# Patient Record
(demographics unavailable — no encounter records)

---

## 2025-01-16 NOTE — DATA REVIEWED
[FreeTextEntry1] : Transthoracic Echocardiogram 11/2224 - EF56% - Mild to Moderate MR - Moderate to Severe Tricuspid Regurgitation - PAP 53mmHg - Mild AI - Mild IN - Moderate to Severe Aortic Stenosis

## 2025-01-16 NOTE — ASSESSMENT
[FreeTextEntry1] : I have independently reviewed the medical records and imaging at the time of this office consultation and discussed the following interpretations with Mr. GAN:   After review of the TTE, Mr. GAN is noted to have severe aortic stenosis - peak gradient 53.6 mmHg, mean gradient 37 mmHg, and an aortic valve area  0.97 cm^2. The risks and benefits of both Surgical Aortic Valve Replacement versus Transcatheter Aortic Valve Replacement (TAVR) have been explained and he would like to proceed with further workup and consideration for TAVR by the Structural Heart Team.   Risks, benefits and alternatives to TAVR were discussed with the patient in detail. Risks discussed included, but not limited to, infection, bleeding, myocardial infarction, cerebrovascular accident, renal failure, vascular injury requiring intervention, cardiac rupture and death. In addition, a roughly 5-10% risk of significant heart block requiring permanent pacemaker implantation was highlighted.   During this visit a shared decision-making process was utilized and included Mr. GAN, his family, and the available options.  Surgical options, transcatheter options and alternatives to surgery were discussed. Mr. GAN's values and preferences were considered. He fully understands and wishes to proceed. All questions were answered to his understanding and satisfaction.   Prior to TAVR, Mr. GAN will require a dedicated TAVR CT scan. The purpose of this CT scan is to evaluate the size of the aortic valve and annulus, and measure peripheral vessel diameters to determine feasibility for transcatheter access for the TAVR, and measure of alternate access options if transfemoral is not feasible.  Once the TAVR CT scan is complete the scan will be reviewed by a multidisciplinary team of interventional cardiologist, cardiac surgeons and PAs and NPs, to plan the best approach for the surgical procedure.   PREOPERATIVE CHECKLIST: (Discussed with patient)  - Confirm allergies, including latex: - Confirm pacemaker: - Anticoagulation/antiplatelets noted and will be discontinued/continued: - SGLT-2 Inhibitors (discontinued 3 days prior to surgery) or GLP-1 (discontinued 1 week prior to surgery): - All other supplements, NSAIDs and fish oil were discussed and will be held one week before surgery   PLAN: - TAVR CTA - Cardiac Catheterization, to assess the Coronary arteries - Transthoracic Echocardiogram at [facility] to assess the structure and function of the heart valves  - TAVR tentatively

## 2025-01-16 NOTE — HISTORY OF PRESENT ILLNESS
[FreeTextEntry1] : Mr. RACHELLE GAN is a 84 year old male referred by Dr. Collins for Aortic Stenosis.   He has a PMH pertinent for HLD, CAD, and Severe Aortic Stenosis.    He presents today for TAVR candidacy.    NYHA Class

## 2025-01-17 NOTE — DATA REVIEWED
[FreeTextEntry1] : Transthoracic Echocardiogram 11/2224 - EF56% - Mild to Moderate MR - Moderate to Severe Tricuspid Regurgitation - PAP 53mmHg - Mild AI - Mild AK - Moderate to Severe Aortic Stenosis

## 2025-01-17 NOTE — ASSESSMENT
[FreeTextEntry1] : I have independently reviewed the medical records and imaging at the time of this office consultation and discussed the following interpretations with Mr. GAN:   After review of the TTE, Mr. GAN is noted to have severe aortic stenosis - peak gradient 53.6 mmHg, mean gradient 37 mmHg, and an aortic valve area  0.97 cm^2. The risks and benefits of both Surgical Aortic Valve Replacement versus Transcatheter Aortic Valve Replacement (TAVR) have been explained and he would like to proceed with further workup and consideration for TAVR by the Structural Heart Team.   Risks, benefits and alternatives to TAVR were discussed with the patient in detail. Risks discussed included, but not limited to, infection, bleeding, myocardial infarction, cerebrovascular accident, renal failure, vascular injury requiring intervention, cardiac rupture and death. In addition, a roughly 5-10% risk of significant heart block requiring permanent pacemaker implantation was highlighted.   During this visit a shared decision-making process was utilized and included Mr. GAN, his family, and the available options.  Surgical options, transcatheter options and alternatives to surgery were discussed. Mr. GAN's values and preferences were considered. He fully understands and wishes to proceed. All questions were answered to his understanding and satisfaction.   Prior to TAVR, Mr. GAN will require a dedicated TAVR CT scan. The purpose of this CT scan is to evaluate the size of the aortic valve and annulus, and measure peripheral vessel diameters to determine feasibility for transcatheter access for the TAVR, and measure of alternate access options if transfemoral is not feasible.  Once the TAVR CT scan is complete the scan will be reviewed by a multidisciplinary team of interventional cardiologist, cardiac surgeons and PAs and NPs, to plan the best approach for the surgical procedure.   PREOPERATIVE CHECKLIST: (Discussed with patient)  - Confirm allergies, including latex: - Confirm pacemaker: - Anticoagulation/antiplatelets noted and will be discontinued/continued: - SGLT-2 Inhibitors (discontinued 3 days prior to surgery) or GLP-1 (discontinued 1 week prior to surgery): - All other supplements, NSAIDs and fish oil were discussed and will be held one week before surgery   PLAN: - TAVR CTA - Cardiac Catheterization, to assess the Coronary arteries - Transthoracic Echocardiogram at [facility] to assess the structure and function of the heart valves  - TAVR tentatively

## 2025-01-21 NOTE — HISTORY OF PRESENT ILLNESS
[FreeTextEntry1] : Mr. RACHELLE GAN is a 84 year old male referred by Dr. Collins for Aortic Stenosis.   He has a PMH pertinent for HLD, CAD (CABG 1981 at [facility], PCI 2004), BPH, pulmonary artery stenosis, and Severe Aortic Stenosis.    He presents today for TAVR candidacy. He denies any dizziness, or syncopal episode. For the past year he has noticed a progressive shortness of breath with minimal exertion as well as lower extremity edema. He has noticed a pressure in the chest and a heaviness inside of his head with mild exertion.     NYHA Class III  Pulmonologist: Dr. Santi Snowden

## 2025-01-21 NOTE — ASSESSMENT
[FreeTextEntry1] : I have independently reviewed the medical records and imaging at the time of this office consultation and discussed the following interpretations with Mr. GAN:   After review of the TTE, Mr. GAN is noted to have severe aortic stenosis - peak gradient 53.6 mmHg, mean gradient 37 mmHg, and an aortic valve area  0.97 cm^2. The risks and benefits of both Surgical Aortic Valve Replacement versus Transcatheter Aortic Valve Replacement (TAVR) have been explained and he would like to proceed with further workup and consideration for TAVR by the Structural Heart Team.   Risks, benefits and alternatives to TAVR were discussed with the patient in detail. Risks discussed included, but not limited to, infection, bleeding, myocardial infarction, cerebrovascular accident, renal failure, vascular injury requiring intervention, cardiac rupture and death. In addition, a roughly 5-10% risk of significant heart block requiring permanent pacemaker implantation was highlighted.   During this visit a shared decision-making process was utilized and included Mr. GAN, his family, and the available options.  Surgical options, transcatheter options and alternatives to surgery were discussed. Mr. GAN's values and preferences were considered. He fully understands and wishes to proceed. All questions were answered to his understanding and satisfaction.   Prior to TAVR, Mr. GAN will require a dedicated TAVR CT scan. The purpose of this CT scan is to evaluate the size of the aortic valve and annulus, and measure peripheral vessel diameters to determine feasibility for transcatheter access for the TAVR, and measure of alternate access options if transfemoral is not feasible.  Once the TAVR CT scan is complete the scan will be reviewed by a multidisciplinary team of interventional cardiologist, cardiac surgeons and PAs and NPs, to plan the best approach for the surgical procedure.   PREOPERATIVE CHECKLIST: (Discussed with patient)  - Confirm allergies, including latex: - Confirm pacemaker: - Anticoagulation/antiplatelets noted and will be discontinued/continued: - SGLT-2 Inhibitors (discontinued 3 days prior to surgery) or GLP-1 (discontinued 1 week prior to surgery): - All other supplements, NSAIDs and fish oil were discussed and will be held one week before surgery   PLAN: - TAVR CTA - Cardiac Catheterization, to assess the Coronary arteries - Transthoracic Echocardiogram at [facility] to assess the structure and function of the heart valves  - TAVR tentatively        IDr. Joshi personally performed the evaluation and management (E/M) services for this new patient. That E/M includes conducting the initial examination, assessing all conditions, and establishing the plan of care. Today, Geovany Garrett NP was here to observe my evaluation and management services for this patient.

## 2025-01-21 NOTE — REVIEW OF SYSTEMS
[Palpitations] : palpitations [Lower Ext Edema] : lower extremity edema [Shortness Of Breath] : shortness of breath [SOB on Exertion] : shortness of breath during exertion [Negative] : Heme/Lymph [Chest Pain] : no chest pain

## 2025-01-21 NOTE — PHYSICAL EXAM
[General Appearance - Well Nourished] : well nourished [General Appearance - Well Developed] : well developed [Sclera] : the sclera and conjunctiva were normal [Outer Ear] : the ears and nose were normal in appearance [Neck Appearance] : the appearance of the neck was normal [Respiration, Rhythm And Depth] : normal respiratory rhythm and effort [Auscultation Breath Sounds / Voice Sounds] : lungs were clear to auscultation bilaterally [Heart Rate And Rhythm] : heart rate was normal and rhythm regular [Examination Of The Chest] : the chest was normal in appearance [2+] : left 2+ [Abnormal Walk] : normal gait [Skin Color & Pigmentation] : normal skin color and pigmentation [Sensation] : the sensory exam was normal to light touch and pinprick [Oriented To Time, Place, And Person] : oriented to person, place, and time [Impaired Insight] : insight and judgment were intact [FreeTextEntry1] : NYHAC III

## 2025-01-21 NOTE — DATA REVIEWED
[FreeTextEntry1] : Transthoracic Echocardiogram 11/22/24 - EF56% - Mild to Moderate MR - Moderate to Severe Tricuspid Regurgitation - PAP 53mmHg - Mild AI - Mild FL - Moderate to Severe Aortic Stenosis

## 2025-03-10 NOTE — HISTORY OF PRESENT ILLNESS
[FreeTextEntry1] : 84 Male with Severe Aortic stenosis, Mod AI/MR/TR, Chronic Diastolic CHF, Remote MI, CAD s/p C1L (9/30/81 - still patent) & Multiple PCI (last to mLCx & OM2 10/24/23 - also patent stents to LAD & RCA), HTN, HLD, Mild Pulm HTN, Baseline Sinus Bradycardia with 1st degree AVB, Former Smoker, Stage 2 CKD, BPH, Chronic Anemia, Obesity, OA, Cervical Spondylosis, Right Rotator Cuff Repair, Spinal Stenosis s/p Revision Laminectomy of L2L4 & Fusion L2L3 with CBP, Left THR, Ankle Surgery, [place] Vet with PTSD with Anxiety/Depression, b/l Cataract Surgery  (A1C 5.5) on 3/5/25 admitted through [facility], 3/5 Moderate Sedation Percutaneous TF-TAVR via RCFA (Amy) LVEDP 16 mmHg, post deployment no PVL (mG 7 mmHg), no conduction or rhythm changes, transferred to  [place] [facility].  Patient with no significant issues post op and remained stable for discharge with MCOT monitoring. Initial [facility] visit completed in home.  CC "I'm feeling well"

## 2025-03-10 NOTE — PHYSICAL EXAM
[Sclera] : the sclera and conjunctiva were normal [Neck Appearance] : the appearance of the neck was normal [Respiration, Rhythm And Depth] : normal respiratory rhythm and effort [Exaggerated Use Of Accessory Muscles For Inspiration] : no accessory muscle use [Auscultation Breath Sounds / Voice Sounds] : lungs were clear to auscultation bilaterally [Apical Impulse] : the apical impulse was normal [Heart Rate And Rhythm] : heart rate was normal and rhythm regular [Heart Sounds] : normal S1 and S2 [Murmurs] : no murmurs [Chest Visual Inspection Thoracic Asymmetry] : no chest asymmetry [1+] : left 1+ [FreeTextEntry2] : B/L LE with trace pedal edema, B/L calves soft, [Bowel Sounds] : normal bowel sounds [Abnormal Walk] : normal gait [Musculoskeletal - Swelling] : no joint swelling seen [Skin Color & Pigmentation] : normal skin color and pigmentation [Skin Turgor] : normal skin turgor [] : no rash [FreeTextEntry1] : at baseline  [Oriented To Time, Place, And Person] : oriented to person, place, and time [Impaired Insight] : insight and judgment were intact [Affect] : the affect was normal

## 2025-03-10 NOTE — REASON FOR VISIT
[Follow-Up: _____] : a [unfilled] follow-up visit [Spouse] : spouse [FreeTextEntry1] : [facility]- Transitional Care- [facility]

## 2025-03-10 NOTE — ASSESSMENT
[FreeTextEntry1] : Pt recovering well at home s/p TAVR. Reviewed all medications and dosages with pt understanding. Pt has all medications in home and is taking as prescribed. Pain controlled with current medication regimen. No new symptoms, issues or concerns to report at this time. Pt MCOT with low sensor battery left, took off patch and demonstrated how to apply once fully charged. Pt and spouse with good understanding. Pt is ambulating well and has no concerns at this time.   PLAN:  -Continue current medication regimen   -Continue Post Operative Care including:      -Cleanse all incisions DAILY with mild soap and water. Avoid lotion, powders and/or creams near or on incisions       -Daily weights- report any increase of 2 lbs or more overnight to the [facility] or [facility] team       -Incentive spirometry with cough and deep breathing several times per hour      -Ambulate as much as tolerated including outdoors if weather and safety permits      -Avoid lifting anything more than 5 lbs and avoid straining      -Maintain a low sodium, low fat, heart healthy diet, including healthy sources of protein   [facility] team will continue to follow up with pt status. NP/CCC roles explained with pt understanding, contact information provided. Pt agrees to call with any questions, issues or concerns.  Worsening symptoms reviewed with patient understanding.    FOLLOW UP APPOINTMENTS:  CTS: 3/12 CARDIOLOGIST:  Dr. Collins, wife to schedule sooner appt as pt next appt in June. She will update [facility] team if unable to obtain timely appt PCP: Pt encouraged to follow up within one month of discharge

## 2025-03-12 NOTE — REASON FOR VISIT
[de-identified] : Transcatheter Aortic Valve Replacement with #26 Amy Mensah Valve [de-identified] : 3/5/25 [de-identified] : Post op course uneventful, patient discharged 3/6/25.  He states he has been feeling a little lightheaded everyday.  He naps during the day and is in bed early.  He has some "phlegm" he is unable to get up.  He feels he is coming down with a head cold. He feels each day he is completing more activity and went to [employer] yesterday but had to sit down. The swelling in his feet has went down substaintially.

## 2025-03-12 NOTE — PROCEDURE
[FreeTextEntry1] : Postop Transthoracic Echocardiogram 3/5/25 1. Left ventricular cavity is normal in size. Left ventricular wall thickness is normal. Left ventricular systolic function is normal with an ejection fraction visually estimated at 55 to 60 %.  2. There is mild (grade 1) left ventricular diastolic dysfunction, with elevated left ventricular filling pressure.  3. Normal right ventricular cavity size and normal right ventricular systolic function.  4. Left atrium is severely dilated.  5. The right atrium is mildly dilated.  6. There is mild calcification of the mitral valve annulus.  7. Thickened mitral valve leaflets.  8. Mild mitral regurgitation.  9. A DARRELL 3 Ultra RESILIA (TAVR) valve replacement is present in the aortic position The prosthetic valve is well seated. No intravalvular regurgitation No paravalvular regurgitation. 10. Moderate to severe tricuspid regurgitation. 11. Mild to moderate pulmonic regurgitation. 12. No pericardial effusion seen. 13. Estimated pulmonary artery systolic pressure is 32 mmHg, consistent with normal pulmonary artery pressure. 14. Compared to the transthoracic echocardiogram performed on 2/13/2025, patient is s/p TAVR.
[FreeTextEntry1] : Postop Transthoracic Echocardiogram 3/5/25 1. Left ventricular cavity is normal in size. Left ventricular wall thickness is normal. Left ventricular systolic function is normal with an ejection fraction visually estimated at 55 to 60 %.  2. There is mild (grade 1) left ventricular diastolic dysfunction, with elevated left ventricular filling pressure.  3. Normal right ventricular cavity size and normal right ventricular systolic function.  4. Left atrium is severely dilated.  5. The right atrium is mildly dilated.  6. There is mild calcification of the mitral valve annulus.  7. Thickened mitral valve leaflets.  8. Mild mitral regurgitation.  9. A DARRELL 3 Ultra RESILIA (TAVR) valve replacement is present in the aortic position The prosthetic valve is well seated. No intravalvular regurgitation No paravalvular regurgitation. 10. Moderate to severe tricuspid regurgitation. 11. Mild to moderate pulmonic regurgitation. 12. No pericardial effusion seen. 13. Estimated pulmonary artery systolic pressure is 32 mmHg, consistent with normal pulmonary artery pressure. 14. Compared to the transthoracic echocardiogram performed on 2/13/2025, patient is s/p TAVR.
[Negative] : Heme/Lymph

## 2025-03-12 NOTE — REASON FOR VISIT
[de-identified] : Transcatheter Aortic Valve Replacement with #26 Amy Mensah Valve [de-identified] : 3/5/25 [de-identified] : Post op course uneventful, patient discharged 3/6/25.  He states he has been feeling a little lightheaded everyday.  He naps during the day and is in bed early.  He has some "phlegm" he is unable to get up.  He feels he is coming down with a head cold. He feels each day he is completing more activity and went to [employer] yesterday but had to sit down. The swelling in his feet has went down substaintially.

## 2025-03-12 NOTE — ASSESSMENT
[FreeTextEntry1] : Mr. GAN presents today for his postoperative follow up appointment; he is status post Transcatheter Aortic Valve Replacement.   Physical Exam was as noted above. MCOT review was unremarkable.   During the visit, we discussed the importance of increasing activity and exercise as tolerated. We discussed the need for antibiotic prophylaxis with procedures such as dental work and colonoscopy or endoscopy. We recommend waiting a full six months before undergoing any dental procedure or cleaning. He should maintain the MCOT device as instructed for a total of 30 days.   Overall, I am pleased with Mr. GAN's progress postoperatively. I am recommending that he continue follow up care with Cardiology and Primary Care Provider; he will return to care in office as needed. All questions answered, he verbalizes understanding.   PLAN:  - Follow up Cardiology - CBC/BMP 30 Day Follow Up - Transthoracic Echocardiogram 30 Day Follow Up - EKG 30 Day Follow Up - Maintain MCOT

## 2025-03-12 NOTE — PHYSICAL EXAM
[] : no respiratory distress [Respiration, Rhythm And Depth] : normal respiratory rhythm and effort [Exaggerated Use Of Accessory Muscles For Inspiration] : no accessory muscle use [Auscultation Breath Sounds / Voice Sounds] : lungs were clear to auscultation bilaterally [Apical Impulse] : the apical impulse was normal [Heart Rate And Rhythm] : heart rate was normal and rhythm regular [Heart Sounds] : normal S1 and S2 [FreeTextEntry3] :  All Review of Systems Negative.